# Patient Record
Sex: MALE | Race: OTHER | NOT HISPANIC OR LATINO | URBAN - METROPOLITAN AREA
[De-identification: names, ages, dates, MRNs, and addresses within clinical notes are randomized per-mention and may not be internally consistent; named-entity substitution may affect disease eponyms.]

---

## 2019-11-23 ENCOUNTER — EMERGENCY (EMERGENCY)
Facility: HOSPITAL | Age: 29
LOS: 1 days | Discharge: HOME | End: 2019-11-23
Admitting: EMERGENCY MEDICINE
Payer: COMMERCIAL

## 2019-11-23 VITALS
HEART RATE: 62 BPM | OXYGEN SATURATION: 100 % | SYSTOLIC BLOOD PRESSURE: 128 MMHG | RESPIRATION RATE: 18 BRPM | DIASTOLIC BLOOD PRESSURE: 72 MMHG

## 2019-11-23 VITALS
RESPIRATION RATE: 16 BRPM | DIASTOLIC BLOOD PRESSURE: 74 MMHG | HEART RATE: 61 BPM | SYSTOLIC BLOOD PRESSURE: 121 MMHG | TEMPERATURE: 98 F | OXYGEN SATURATION: 100 %

## 2019-11-23 PROCEDURE — 71046 X-RAY EXAM CHEST 2 VIEWS: CPT | Mod: 26

## 2019-11-23 PROCEDURE — 99283 EMERGENCY DEPT VISIT LOW MDM: CPT

## 2019-11-23 NOTE — ED ADULT TRIAGE NOTE - CHIEF COMPLAINT QUOTE
Pt complaining of a lump on R side of chest x 1 month. Pt states over the past few days he has noticed an increase in the size and has been having pain.

## 2019-11-23 NOTE — ED PROVIDER NOTE - PHYSICAL EXAMINATION
small area [id number] of soft tissue swelling to right lower ribs, no overlying erythema, soft to palpation and mobile

## 2019-11-23 NOTE — ED PROVIDER NOTE - CLINICAL SUMMARY MEDICAL DECISION MAKING FREE TEXT BOX
28yM w/no pmhx p/w right lower rib pain x 1 month. Possible lipoma vs MSK pain. VSS, pt appears comfortable, no pain at present.

## 2019-11-23 NOTE — ED PROVIDER NOTE - OBJECTIVE STATEMENT
28yM w/no pmhx p/w right lower rib pain x 1 month. Pt states over the past month he noticed a small area of swelling to the right lower ribs with which has gradually become painful. He reports the pain is only with leaning forward onto the area or with direct pressure to the area. Pt denies chest pain, shortness of breath, rash. fever/chills, nausea, vomiting, diarrhea, recent travel or illness, abd pain or any other concerns.

## 2019-11-23 NOTE — ED PROVIDER NOTE - NSFOLLOWUPINSTRUCTIONS_ED_ALL_ED_FT
Follow up with a primary care provider within 1-2 weeks, you can call the clinic at [phone] to make an appointment, referral list provided  Take Motrin 600mg every 6 hours as needed for pain, take with food  Return to the ER with any worsening or concerning symptoms, increased pain, shortness of breath, palpitations, chest pain or any other concerns.

## 2019-11-23 NOTE — ED PROVIDER NOTE - PATIENT PORTAL LINK FT
You can access the FollowMyHealth Patient Portal offered by [facility] by registering at the following website: [email or web address]. By joining [facility]’s FollowMyHealth portal, you will also be able to view your health information using other applications (apps) compatible with our system.

## 2020-02-07 NOTE — ED PROVIDER NOTE - DATE/TIME 1
CHIEF COMPLAINT:    Chief Complaint   Patient presents with   • Follow-up       SUBJECTIVE:  Primitivo is a 45 year old male who presents for follow up to recent urgent care visit on 2/3/2020 for bronchitis.  He is taking the Doxycyline as prescribed, reports he did not  the medrol dose pack as he has not insurance and could not afford it.  He has been laid off/lost his job because he had just started at [employer] when he got sick shortly after.  Reports improvement in his cough and overall feeling better.  He is a smoker.           ROS: Negative with the exceptions listed above    There are no active problems to display for this patient.      ALLERGIES:  No Known Allergies    Outpatient Medications Marked as Taking for the 2/7/20 encounter (Office Visit) with AMINA Perales   Medication Sig Dispense Refill   • doxycycline hyclate (VIBRAMYCIN) 100 MG capsule Take 1 capsule by mouth 2 times daily. 20 capsule 0   • venlafaxine XR (EFFEXOR XR) 75 MG 24 hr capsule Take 112.5 mg by mouth daily.     • FLUoxetine (PROZAC) 10 MG capsule Take 30 mg by mouth daily.     • aspirin 325 MG tablet Take 325 mg by mouth every 4 hours as needed for Pain.           PHYSICAL EXAM:  VITAL SIGNS:   Visit Vitals  /88 (BP Location: LUE - Left upper extremity, Patient Position: Sitting, Cuff Size: Regular)   Pulse 78   Resp 16   Ht 5' 8\" (1.727 m)   Wt 108.3 kg   SpO2 97%   BMI 36.29 kg/m²      GENERAL: alert, active, comfortable, not toxic, and in no acute distress.  HEENT:          Eyes - conjunctiva and sclera are not inflamed         LUNGS: end expiratory wheezing, faint rhonchi improved with coughing.  Otherwise good air movement  HEART: quiet precordium, regular rate and rhythm w/o murmurs, clicks, or gallops  SKIN: Warm and dry.  Normal turgor.  No rashes or lesions noted.    ASSESSMENT:  Bronchitis    PLAN:  Improving, complete course of Doxycycline  Sent RX for Prednisone 20mg take one daily for 5 days as this would be  more cost effective for him    Note provided for his employer stating that he was reevaluated and improving and should be able to return to work on 2/10/2020.    AMINA Perales     23-Nov-2019 23:56

## 2024-03-13 PROBLEM — Z00.00 ENCOUNTER FOR PREVENTIVE HEALTH EXAMINATION: Status: ACTIVE | Noted: 2024-03-13

## 2024-03-13 PROBLEM — Z78.9 OTHER SPECIFIED HEALTH STATUS: Chronic | Status: ACTIVE | Noted: 2019-11-23

## 2024-03-14 ENCOUNTER — APPOINTMENT (OUTPATIENT)
Dept: ULTRASOUND IMAGING | Facility: CLINIC | Age: 34
End: 2024-03-14
Payer: COMMERCIAL

## 2024-03-14 PROCEDURE — 76700 US EXAM ABDOM COMPLETE: CPT

## 2024-11-08 ENCOUNTER — NON-APPOINTMENT (OUTPATIENT)
Age: 34
End: 2024-11-08

## 2024-11-11 ENCOUNTER — APPOINTMENT (OUTPATIENT)
Dept: UROLOGY | Facility: CLINIC | Age: 34
End: 2024-11-11
Payer: COMMERCIAL

## 2024-11-11 VITALS
SYSTOLIC BLOOD PRESSURE: 143 MMHG | HEART RATE: 79 BPM | DIASTOLIC BLOOD PRESSURE: 84 MMHG | RESPIRATION RATE: 16 BRPM | HEIGHT: 67 IN

## 2024-11-11 VITALS — BODY MASS INDEX: 33.67 KG/M2 | WEIGHT: 215 LBS

## 2024-11-11 DIAGNOSIS — F17.210 NICOTINE DEPENDENCE, CIGARETTES, UNCOMPLICATED: ICD-10-CM

## 2024-11-11 DIAGNOSIS — R79.89 OTHER SPECIFIED ABNORMAL FINDINGS OF BLOOD CHEMISTRY: ICD-10-CM

## 2024-11-11 DIAGNOSIS — N50.89 OTHER SPECIFIED DISORDERS OF THE MALE GENITAL ORGANS: ICD-10-CM

## 2024-11-11 DIAGNOSIS — N46.9 MALE INFERTILITY, UNSPECIFIED: ICD-10-CM

## 2024-11-11 PROCEDURE — 99204 OFFICE O/P NEW MOD 45 MIN: CPT

## 2024-11-30 ENCOUNTER — APPOINTMENT (OUTPATIENT)
Dept: ULTRASOUND IMAGING | Facility: CLINIC | Age: 34
End: 2024-11-30

## 2024-12-23 ENCOUNTER — APPOINTMENT (OUTPATIENT)
Dept: UROLOGY | Facility: CLINIC | Age: 34
End: 2024-12-23